# Patient Record
Sex: MALE | URBAN - METROPOLITAN AREA
[De-identification: names, ages, dates, MRNs, and addresses within clinical notes are randomized per-mention and may not be internally consistent; named-entity substitution may affect disease eponyms.]

---

## 2024-10-15 ENCOUNTER — LAB OUTSIDE AN ENCOUNTER (OUTPATIENT)
Dept: URBAN - METROPOLITAN AREA SURGERY CENTER 16 | Facility: SURGERY CENTER | Age: 54
End: 2024-10-15

## 2024-10-24 ENCOUNTER — CLAIMS CREATED FROM THE CLAIM WINDOW (OUTPATIENT)
Dept: URBAN - METROPOLITAN AREA SURGERY CENTER 16 | Facility: SURGERY CENTER | Age: 54
End: 2024-10-24
Payer: COMMERCIAL

## 2024-10-24 ENCOUNTER — CLAIMS CREATED FROM THE CLAIM WINDOW (OUTPATIENT)
Dept: URBAN - METROPOLITAN AREA CLINIC 4 | Facility: CLINIC | Age: 54
End: 2024-10-24
Payer: COMMERCIAL

## 2024-10-24 DIAGNOSIS — Z86.0100 PERSONAL HISTORY OF COLONIC POLYPS: ICD-10-CM

## 2024-10-24 DIAGNOSIS — D12.3 ADENOMA OF TRANSVERSE COLON: ICD-10-CM

## 2024-10-24 DIAGNOSIS — D12.3 BENIGN NEOPLASM OF TRANSVERSE COLON: ICD-10-CM

## 2024-10-24 DIAGNOSIS — Z09 ENCOUNTER FOR FOLLOW-UP EXAMINATION AFTER COMPLETED TREATMENT FOR CONDITIONS OTHER THAN MALIGNANT NEOPLASM: ICD-10-CM

## 2024-10-24 DIAGNOSIS — Z86.0100 HISTORY OF COLON POLYPS: ICD-10-CM

## 2024-10-24 DIAGNOSIS — K57.30 DIVERTICULA, COLON: ICD-10-CM

## 2024-10-24 PROCEDURE — 00812 ANES LWR INTST SCR COLSC: CPT | Performed by: ANESTHESIOLOGIST ASSISTANT

## 2024-10-24 PROCEDURE — 45380 COLONOSCOPY AND BIOPSY: CPT | Performed by: INTERNAL MEDICINE

## 2024-10-24 PROCEDURE — 88305 TISSUE EXAM BY PATHOLOGIST: CPT | Performed by: PATHOLOGY

## 2024-10-24 PROCEDURE — 00812 ANES LWR INTST SCR COLSC: CPT | Performed by: ANESTHESIOLOGY

## 2024-12-13 ENCOUNTER — OFFICE VISIT (OUTPATIENT)
Dept: URBAN - METROPOLITAN AREA CLINIC 105 | Facility: CLINIC | Age: 54
End: 2024-12-13
Payer: COMMERCIAL

## 2024-12-13 ENCOUNTER — DASHBOARD ENCOUNTERS (OUTPATIENT)
Age: 54
End: 2024-12-13

## 2024-12-13 VITALS
HEIGHT: 68 IN | TEMPERATURE: 97.1 F | BODY MASS INDEX: 25.55 KG/M2 | DIASTOLIC BLOOD PRESSURE: 77 MMHG | WEIGHT: 168.6 LBS | SYSTOLIC BLOOD PRESSURE: 124 MMHG | HEART RATE: 77 BPM

## 2024-12-13 DIAGNOSIS — Z86.0101 PERSONAL HISTORY OF ADENOMATOUS AND SERRATED COLON POLYPS: ICD-10-CM

## 2024-12-13 DIAGNOSIS — L29.0 PRURITUS ANI: ICD-10-CM

## 2024-12-13 DIAGNOSIS — K90.41 GLUTEN INTOLERANCE: ICD-10-CM

## 2024-12-13 PROCEDURE — 99214 OFFICE O/P EST MOD 30 MIN: CPT | Performed by: INTERNAL MEDICINE

## 2024-12-13 NOTE — HPI-TODAY'S VISIT:
55 yo male who presents after an initial direct access screening colonoscopy on 10/24/24.  A 3 mm tubular adenoma was removed.  He had left-sided diverticulosis and small internal hemorrhoids.  At the time he noted puritis ani.  I questioned him further post colonosocpy and he noted using wet wipes.  I recommended he discontinue wet wipes since they can irritate the perianal skin.  I asked him to take two showers a day to then wash perianal skin; dry with a hair dryer on cool setting; and apply desitin cream perianally 2-3x/day (after shower and BM).  We discussed not scratching the perianal skin; cutting nails short.    He notes today that his perianal itching is much improved.  He continues with the above regimen.  He is not using Desitin any longer.  He used it for 3 weeks.  He notes previously using anti-fungal cream and PCP previously recommended Nizoral shampoo.    He describes a 8-10 year history of chronic diarrhea.  He states he had serum food allergy testing and was noted to be allergic to eggs, rice, oatmeal, farro, and kinwa.  He states gluen was "in the orange" so some reaction.  He is completely gluten free and avoids these other foods which has improved his bowel habit.  He takes a probiotic and avoids suggar.    Labs 8/30/24 - CBC, CMP, TSH all normal.

## 2024-12-14 PROBLEM — 1269424006: Status: ACTIVE | Noted: 2024-12-13

## 2024-12-16 LAB
IMMUNOGLOBULIN A, QN, SERUM: 346
T-TRANSGLUTAMINASE (TTG) IGA: <1

## 2025-08-26 ENCOUNTER — OFFICE VISIT (OUTPATIENT)
Dept: URBAN - METROPOLITAN AREA CLINIC 105 | Facility: CLINIC | Age: 55
End: 2025-08-26
Payer: COMMERCIAL

## 2025-08-26 DIAGNOSIS — K90.41 GLUTEN INTOLERANCE: ICD-10-CM

## 2025-08-26 DIAGNOSIS — L29.0 PRURITUS ANI: ICD-10-CM

## 2025-08-26 DIAGNOSIS — Z86.0101 PERSONAL HISTORY OF ADENOMATOUS AND SERRATED COLON POLYPS: ICD-10-CM

## 2025-08-26 PROCEDURE — 99213 OFFICE O/P EST LOW 20 MIN: CPT | Performed by: INTERNAL MEDICINE

## 2025-08-26 RX ORDER — ROSUVASTATIN CALCIUM 10 MG/1
1 TABLET TABLET, COATED ORAL ONCE A DAY
Qty: 30 | Status: ACTIVE | COMMUNITY

## 2025-08-26 RX ORDER — EZETIMIBE 10 MG/1
1 TABLET TABLET ORAL ONCE A DAY
Qty: 30 | Status: ACTIVE | COMMUNITY